# Patient Record
Sex: FEMALE
[De-identification: names, ages, dates, MRNs, and addresses within clinical notes are randomized per-mention and may not be internally consistent; named-entity substitution may affect disease eponyms.]

---

## 2017-01-25 ENCOUNTER — APPOINTMENT (OUTPATIENT)
Dept: NEUROLOGY | Facility: CLINIC | Age: 52
End: 2017-01-25

## 2017-05-23 ENCOUNTER — APPOINTMENT (OUTPATIENT)
Dept: ENDOCRINOLOGY | Facility: CLINIC | Age: 52
End: 2017-05-23

## 2019-11-04 ENCOUNTER — CHART COPY (OUTPATIENT)
Age: 54
End: 2019-11-04

## 2019-11-04 ENCOUNTER — APPOINTMENT (OUTPATIENT)
Dept: PHYSICAL MEDICINE AND REHAB | Facility: CLINIC | Age: 54
End: 2019-11-04
Payer: COMMERCIAL

## 2019-11-04 VITALS
OXYGEN SATURATION: 98 % | HEART RATE: 92 BPM | HEIGHT: 66 IN | BODY MASS INDEX: 18.8 KG/M2 | DIASTOLIC BLOOD PRESSURE: 75 MMHG | WEIGHT: 117 LBS | SYSTOLIC BLOOD PRESSURE: 124 MMHG

## 2019-11-04 DIAGNOSIS — Z80.7 FAMILY HISTORY OF OTHER MALIGNANT NEOPLASMS OF LYMPHOID, HEMATOPOIETIC AND RELATED TISSUES: ICD-10-CM

## 2019-11-04 PROCEDURE — 99204 OFFICE O/P NEW MOD 45 MIN: CPT | Mod: GC

## 2019-11-04 RX ORDER — DALFAMPRIDINE 10 MG/1
10 TABLET, FILM COATED, EXTENDED RELEASE ORAL
Refills: 0 | Status: ACTIVE | COMMUNITY

## 2019-11-04 RX ORDER — INTERFERON BETA-1A 30 MCG
30 KIT INTRAMUSCULAR
Refills: 0 | Status: ACTIVE | COMMUNITY

## 2019-11-04 RX ORDER — SENNOSIDES 8.6 MG/1
TABLET ORAL
Refills: 0 | Status: ACTIVE | COMMUNITY

## 2019-11-04 RX ORDER — BACLOFEN 20 MG/1
20 TABLET ORAL
Refills: 0 | Status: ACTIVE | COMMUNITY

## 2019-11-04 RX ORDER — UBIDECARENONE/VIT E ACET 100MG-5
CAPSULE ORAL
Refills: 0 | Status: ACTIVE | COMMUNITY

## 2019-11-04 RX ORDER — TIZANIDINE HYDROCHLORIDE 2 MG/1
2 CAPSULE ORAL
Refills: 0 | Status: ACTIVE | COMMUNITY

## 2019-11-04 RX ORDER — MYCOPHENOLATE MOFETIL 500 MG/1
500 TABLET, FILM COATED ORAL
Refills: 0 | Status: ACTIVE | COMMUNITY

## 2019-11-19 NOTE — HISTORY OF PRESENT ILLNESS
[FreeTextEntry1] : Patient is a 53-year-old female history of multiple sclerosis (diagnosed 2002) who presents today for a brace evaluation. Patient reports receiving an AFO approximately 4-5 years ago but discontinued using it secondary to it being too bulky. Patient was referred here secondary to hyperextension at her right knee during ambulation and episodes of knee swelling. Patient denies froilan pain at the right knee but does report episodic swelling and stiffness. Patient ambulates in her home independently with 2 straight axis canes, Patient uses a wheelchair for longer distance community ambulation. No falls reported in the last year. Patient has had 2-3 near fall secondary to catching her right toe.

## 2019-11-19 NOTE — REVIEW OF SYSTEMS
[Patient Intake Form Reviewed] : Patient intake form was reviewed [Joint Stiffness] : joint stiffness [Muscle Weakness] : muscle weakness [Difficulty Walking] : difficulty walking [Negative] : Gastrointestinal [Fever] : no fever [Incontinence] : no incontinence

## 2019-11-19 NOTE — ASSESSMENT
[FreeTextEntry1] : Patient is a 53-year-old female history multiple sclerosis with gait impairments noted above. Patient's exam notable for a significant right knee genu recurvatum with laxity at that posterior knee capsule and heelcord contracture. Patient will require a custom orthotic and the use of the orthosis is permanent. Prescription provided for a right custom molded AFO with [place] ankle joints, adjustable posterior stop, prominent heel, instep strap, full footplate, and flexible toe. Patient will likely require heel lifts to help further reduced genu recurvatum. Prescription provided for a course of outpatient physical therapy with emphasis on gait training with her new AFO and improving her gait mechanics, see RX. Prescription provided for a motorized scooter for long distance ambulation.

## 2019-11-19 NOTE — PHYSICAL EXAM
[FreeTextEntry1] : General: Well developed female in no apparent distress. Patient is awake, alert, and oriented x3. Cooperative.\par HEENT: Normal systolic, atraumatic\par Lungs: Clear to auscultation.\par Cardiac: Regular rate and rhythm.\par Abdomen: Bowel sounds present, nondistended.\par Skin: No rash or skin breakdown\par \par Motor:\par Both upper extremities: Tone normal, active range of motion within functional limits with 5/5 motor power throughout except right extensor digitorum 4/5 motor power, right digit abduction 4/5 MP. Thumb to digit opposition intact, no some difficulty noted with right digit 1- digit 5 opposition. Intrinsic muscle atrophy noted on the right.\par \par Left lower extremity: Hip flexors 4/5 motor power, knee extension 5/5 motor power, ankle 5/5 motor power.\par \par Right lower extremity: Hip flexion 1-2/5 motor power, knee extension 4+/5 motor power, ankle 0/5 motor power. Heelcord contracture noted with ankle dorsiflexion passively to approximately 5-10° shy of neutral with the knee flexed and extended.\par \par Sensory: Mild decrease in pinprick to the right lower extremity, proprioception intact at the right first MTP joint.\par Muscle stretch reflexes +1/2 both upper extremities and symmetric\par \par Functional status: Patient ambulated independently with 2 SAC and noted for toe strike on the right with circumduction. Significant genu recurvatum with mild right uncompensated gluteus medius gait. Genu recurvatum measured to 14° hyperextension during standing.

## 2020-01-13 ENCOUNTER — APPOINTMENT (OUTPATIENT)
Dept: PHYSICAL MEDICINE AND REHAB | Facility: CLINIC | Age: 55
End: 2020-01-13
Payer: COMMERCIAL

## 2020-01-13 VITALS — HEART RATE: 78 BPM | DIASTOLIC BLOOD PRESSURE: 57 MMHG | SYSTOLIC BLOOD PRESSURE: 98 MMHG | OXYGEN SATURATION: 98 %

## 2020-01-13 DIAGNOSIS — M67.01 SHORT ACHILLES TENDON (ACQUIRED), RIGHT ANKLE: ICD-10-CM

## 2020-01-13 PROCEDURE — 99213 OFFICE O/P EST LOW 20 MIN: CPT

## 2020-01-13 NOTE — HISTORY OF PRESENT ILLNESS
[FreeTextEntry1] : Patient is a 54-year-old female with history of multiple sclerosis who was last seen November 4, 2019. Patient received her new AFO December 16, 2019. Patient states that the brace fits well, no pain, no skin breakdown with AFO. Patient states the braces definitely improved her gait mechanics and her foot drop. No falls reported since receiving her AFO. Patient was receiving a recent right knee cortical steroid injection with good pain relief.

## 2020-01-13 NOTE — PHYSICAL EXAM
[FreeTextEntry1] : General: Well developed female in no apparent distress. Patient is awake, alert, and oriented x3. Cooperative.\par Skin: No rash or skin breakdown\par \par Motor:\par \par Left lower extremity: Hip flexors 4/5 motor power, knee extension 5/5 motor power, ankle 5/5 motor power.\par \par Right lower extremity: Hip flexion 1-2/5 motor power, knee extension 4+/5 motor power, ankle 0/5 motor power. Heelcord contracture noted with ankle dorsiflexion passively to approximately 5-10° shy of neutral with the knee flexed and extended.\par \par Sensory: Mild decrease in pinprick to the right lower extremity, proprioception intact at the right first MTP joint.\par Muscle stretch reflexes +1/2 both upper extremities and symmetric\par \par Functional status: Patient ambulated independently with 2 SAC and right Hinged AFO. Patient ambulates with shallow HS and continued significant genu recurvatum. Even with addition of ~1" heel lift on right patient had continued GR.

## 2020-01-13 NOTE — ASSESSMENT
[FreeTextEntry1] : Patient is a 54-year-old female with a history of multiple sclerosis and gait impairments noted above. The patient's new AFO has provided her with a shallow heelstrike, however she continues to have significant genu recurvatum even after the addition of a 1 inch heel lift. Discussed option of a custom molded KAFO which patient prefers to hold off on. Prescription provided for Swedish Knee Cage to be used with her AFO in physical therapy. Prescription provided for bilateral heel lifts- 1/2 inch. The patient provided  Rx for night splint to provide heelcord stretching on the right. Will touch base with  concerning KAFO.

## 2020-03-09 ENCOUNTER — APPOINTMENT (OUTPATIENT)
Dept: PHYSICAL MEDICINE AND REHAB | Facility: CLINIC | Age: 55
End: 2020-03-09

## 2020-11-30 ENCOUNTER — APPOINTMENT (OUTPATIENT)
Dept: PHYSICAL MEDICINE AND REHAB | Facility: CLINIC | Age: 55
End: 2020-11-30
Payer: COMMERCIAL

## 2020-11-30 VITALS
SYSTOLIC BLOOD PRESSURE: 95 MMHG | DIASTOLIC BLOOD PRESSURE: 63 MMHG | OXYGEN SATURATION: 97 % | TEMPERATURE: 98.6 F | HEART RATE: 82 BPM

## 2020-11-30 DIAGNOSIS — M21.861 OTHER SPECIFIED ACQUIRED DEFORMITIES OF RIGHT LOWER LEG: ICD-10-CM

## 2020-11-30 PROCEDURE — 99214 OFFICE O/P EST MOD 30 MIN: CPT

## 2020-12-10 DIAGNOSIS — M25.561 PAIN IN RIGHT KNEE: ICD-10-CM

## 2020-12-10 DIAGNOSIS — M25.562 PAIN IN RIGHT KNEE: ICD-10-CM

## 2020-12-11 ENCOUNTER — APPOINTMENT (OUTPATIENT)
Dept: ORTHOPEDIC SURGERY | Facility: CLINIC | Age: 55
End: 2020-12-11
Payer: COMMERCIAL

## 2020-12-11 DIAGNOSIS — M25.361 OTHER INSTABILITY, RIGHT KNEE: ICD-10-CM

## 2020-12-11 DIAGNOSIS — M22.8X1 OTHER DISORDERS OF PATELLA, RIGHT KNEE: ICD-10-CM

## 2020-12-11 DIAGNOSIS — M22.41 CHONDROMALACIA PATELLAE, RIGHT KNEE: ICD-10-CM

## 2020-12-11 DIAGNOSIS — R26.9 UNSPECIFIED ABNORMALITIES OF GAIT AND MOBILITY: ICD-10-CM

## 2020-12-11 DIAGNOSIS — G35 MULTIPLE SCLEROSIS: ICD-10-CM

## 2020-12-11 PROCEDURE — 99072 ADDL SUPL MATRL&STAF TM PHE: CPT

## 2020-12-11 PROCEDURE — 73564 X-RAY EXAM KNEE 4 OR MORE: CPT | Mod: LT

## 2020-12-11 PROCEDURE — 99203 OFFICE O/P NEW LOW 30 MIN: CPT | Mod: 25

## 2020-12-11 PROCEDURE — 20610 DRAIN/INJ JOINT/BURSA W/O US: CPT | Mod: RT

## 2020-12-11 NOTE — PHYSICAL EXAM
[de-identified] : General appearance: well nourished and hydrated, pleasant, alert and oriented x 3, cooperative.\par HEENT: Normocephalic, EOM intact, Nasal septum midline, Oral cavity clear, External auditory canal clear.\par Cardiovascular: no apparent abnormalities, no lower leg edema, no varicosities, pedal pulses are palpable.\par Lymphatics Lymph nodes: none palpated, Lymphedema: not present.\par Neurologic: sensation is normal, muscle weakness in upper or lower extremities, patella tendon reflexes intact, drop foot on the right\par Dermatologic no apparent skin lesions, moist, warm, no rash.\par Spine:cervical spine appears normal and moves freely, thoracic spine appears normal and moves freely, lumbosacral spine appears normal and moves freely.\par Gait: nonantalgic, and unsteady\par \par difficulty with transfers, hyperextension of right knee\par \par Left knee\par Inspection: no effusion or erythema.\par Wounds: none.\par Alignment: normal.\par Palpation: no specific tenderness on palpation.\par ROM active (in degrees): 0-115\par Ligamentous laxity: all ligaments appear stable,, negative ant. drawer test, negative post. drawer test, stable to varus stress test, stable to valgus stress test. negative Lachman's test, negative pivot shift test\par Meniscal Test: negative McMurrays, negative Yoselyn.\par Patellofemoral Alignment Test: Q angle-, normal.\par Muscle Test: good quad strength.\par \par Right knee\par Drop foot\par Inspection: moderate effusion\par Wounds: none.\par Alignment: normal.\par Palpation: no specific tenderness on palpation.\par ROM active (in degrees): 20 degrees of hyperextension, 0-45 (active), 0-90 (passive)\par Ligamentous laxity: all ligaments appear stable,, negative ant. drawer test, negative post. drawer test, stable to varus stress test, stable to valgus stress test. negative Lachman's test, negative pivot shift test\par Meniscal Test: negative McMurrays, negative Yoselyn.\par Patellofemoral Alignment Test: Q angle-, normal.\par Muscle Test: weak quad strength.\par \par Left hip\par Inspection: No swelling or ecchymosis.\par Wounds: none.\par Palpation: non-tender.\par Stability: no instability.\par Strength: 5/5 all motor groups.\par ROM: no pain with FROM.\par Leg length: equal.\par \par Right hip\par Inspection: No swelling or ecchymosis.\par Wounds: none.\par Palpation: non-tender.\par Stability: no instability.\par Strength: 5/5 all motor groups.\par ROM: limited active ROM\par Leg length: equal.\par \par Left ankle\par Inspection: no erythema noted, no swelling noted.\par Palpation: no pain on palpation .\par ROM: FROM without crepitus.\par Muscle strength: 5/5.\par Stability: no instability noted.\par \par Right ankle\par Inspection: no erythema noted, no swelling noted.\par ROM: FROM without crepitus.\par Palpation: no pain on palpation .\par Muscle strength: 5/5.\par Stability: no instability noted, weakness noted \par \par Left foot\par Inspection: color, texture and turgor are normal.\par ROM: full range of motion of all joints without pain or crepitus.\par Palpation: no tenderness.\par Stability: no instability noted.\par \par Right foot\par Inspection: color, texture and turgor are normal, equinus contracture\par ROM: full range of motion of all joints without pain or crepitus.\par Palpation: no tenderness.\par Stability: no instability noted, weakness noted \par \par Left shoulder\par Inspection: no muscle asymmetry, no atrophy.\par Palpation: no tenderness noted, ACJ non-tender.\par ROM: full active ROM, full passive ROM.\par Strength testing): anterior deltoid, supraspinatus, infraspinatus, subscapularis all 5/5.\par Stability test: ant. apprehension negative, post. apprehension negative, relocation test negative.\par Impingement Test: negative NEER.\par \par Right shoulder\par Inspection: no muscle asymmetry, no atrophy.\par Palpation: no tenderness noted, ACJ non-tender.\par ROM: full active ROM, full passive ROM.\par Strength testing): anterior deltoid, supraspinatus, infraspinatus, subscapularis all 5/5.\par Stability test: ant. apprehension negative, post. apprehension negative, relocation test negative.\par Impingement Test: negative NEER.\par Surgical Wounds: none.\par \par Left elbow\par Inspection: negative swelling.\par Wounds: none.\par Palpation: non-tender.\par ROM: full ROM.\par Strength: 5/5 all groups.\par Stability: no instability.\par Mass: none.\par \par Right elbow\par Inspection: negative swelling.\par Wounds: none.\par Palpation: non-tender.\par ROM: full ROM.\par Strength: 5/5 all groups.\par Stability: no instability.\par Mass: none.\par \par Left wrist\par Inspection: negative swelling.\par Wound: none.\par Palpation (bone): no tenderness.\par ROM: full ROM.\par Strength: full , good.\par \par Right wrist\par Inspection: negative swelling.\par Wound: none.\par Palpation (bone): no tenderness.\par ROM: full ROM.\par Strength: full , good.\par \par Left hand\par Inspection: no skin changes, normal appearance.\par Wounds: none.\par Strength: full , able to make full fist.\par Sensation: light touch intact all fingers and thumb.\par Vascular: good capillary refill < 3 seconds, all fingers and thumb.\par Mass: none.\par \par Right hand\par Inspection: no skin changes, normal appearance.\par Wounds: none.\par Strength: full , able to make full fist.\par Sensation: light touch intact all fingers and thumb.\par Vascular: good capillary refill < 3 seconds, all fingers and thumb.\par Mass: none.  [de-identified] : Right knee xrays, standing AP/Lateral and Merchant films, and 45 degree PA standing view, taken at the office today shows  normal alignment, good joint space maintained, well centered patella, hypoplastic sulcus \par \par Left knee xrays, standing AP/Lateral and Merchant films, and 45 degree PA standing view, taken at the office today shows  normal alignment, good joint space maintained, well centered patella, hypoplastic sulcus \par \par MRI Right knee taken 02/15/2019 films brought in and reviewed, see attached report.

## 2020-12-11 NOTE — HISTORY OF PRESENT ILLNESS
[de-identified] : 55 year old female presents for initial evaluation of right knee pain, for many years. PMHx of MS with right sided weakness. Patient denies any specific injury. She reports that her pain is medially localized, with discomfort on the lateral aspect of her knee. Her primary complain today in stability. She describes the pain to be intermittent and sharp, localized to the lateral aspect, with associated swelling and clicking. She reports that she is able to walk an unlimited distance with the help of a tripod walker. Patient takes the stairs one at a time using the handrail. Patient is able to negotiate the stairs alternating normally. Patient denies the use of any pain medications. She states that she has had multiple aspirations and cortisone injection over the last 2 years, with the most recent in Dec 2019. She reports having an MRI in Feb 2019. Patient states that she has since been using a [place] knee cage brace to prevent hyperextension, but it is not helpful. Patient also has a drop foot on the right side, and uses an AFO. Since her [place] knee cage brace is not working she was prescribed a KAFO, and referred to an orthotist for fitting. The orthotist referred her to Dr. Levine for possible aspiration, as well as a knee evaluation.

## 2020-12-11 NOTE — ADDENDUM
[FreeTextEntry1] : This note was written by Roel Stallings on 12/11/2020 acting as scribe for Dr. Serafin Levine M.D.\par \par I, Dr. Serafin Levine, have read and attest that all the information, medical decision making and discharge instructions within are true and accurate.

## 2020-12-11 NOTE — PROCEDURE
[de-identified] : RIGHT KNEE ASPIRATION\par Discussed at length the procedure of a knee aspiration. The risks, benefits, convalescence and alternatives were reviewed. The possible side effects discussed included but were not limited to: pain, swelling, bleeding and infection. Following this discussion, the knee was prepped with betadine and under a sterile condition, an 18 gauge needle was inserted into the joint through a lateral approach just superior to the patella with the knee in an extended position. The fluid was aspirated and sent for evaluation. Upon withdrawal of the needle the site was cleaned with alcohol and a bandaid applied. The patient tolerated the aspiration well and there were no adverse effects. Post aspiration instructions included no strenuous activity for 24 hours, cryotherapy and if there are any adverse effects to contact the office.\par \par 35 cc of clear yellow synovial fluid were aspirated from the right knee. \par \par RIGHT KNEE CORTISONE INJECTION\par Discussed at length with the patient the planned steroid and lidocaine injection. The risks, benefits, convalescence and alternatives were reviewed. The possible side effects discussed included but were not limited to: pain, swelling, heat and redness. These symptoms are generally mild but if they are extensive then contact the office. Giving pain relievers by mouth such as NSAID’s or Tylenol can generally treat the reactions to steroid and lidocaine. Rare cases of infection have been noted. Rash, hives and itching may occur post injection. If you have muscle pain or cramps, flushing and or swelling of the face, rapid heart beat, nausea, dizziness, fever, chills, headache, difficulty breathing, swelling in the arms or legs, or have a prickly feeling of your skin, contact a health care provider immediately.\par \par Following this discussion, the knee was prepped with betadine and under sterile conditions 9 cc of 1% lidocaine and 1 cc depo-medrol were injected with a 21 gauge needle. The needle was introduced into the joint, aspiration was performed to ensure intra-articular placement and the medication was injected. Upon withdrawal of the needle the site was cleaned with alcohol and a bandaid applied. The patient tolerated the injection well and there were no adverse effects. Post injection instructions included no strenuous activity for 24 hours, cryotherapy and if there are any adverse effects to contact the office

## 2020-12-15 LAB
B PERT IGG+IGM PNL SER: ABNORMAL
COLOR FLD: NORMAL
FLUID INTAKE SUBSTANCE CLASS: NORMAL
LYMPHOCYTES # FLD MANUAL: 59 %
MESOTHL CELL NFR FLD: 3 %
MONOS+MACROS NFR FLD MANUAL: 34 %
NEUTS SEG # FLD MANUAL: 4 %
RBC # FLD MANUAL: 8000 /UL
SYCRY CLARITY: ABNORMAL
SYCRY COLOR: ABNORMAL
SYCRY ID: NORMAL
SYCRY TUBE: NORMAL
TOTAL CELLS COUNTED FLD: 270 /UL
TUBE TYPE: NORMAL

## 2021-01-04 LAB — BACTERIA FLD CULT: NORMAL

## 2021-01-05 NOTE — ASSESSMENT
[FreeTextEntry1] : Patient is a 55-year-old female history of multiple sclerosis, severe right genu recurvatum with the impairments noted above. The patient's Telugu Knee cage is providing minimal improvement in her genu recurvatum and the clinical exam is notable for right knee degenerative joint disease. The patient's posterior ligaments are excessively stretched at the knee and adding further heel lifts will not be adequate. Due to patient’s progression of MS and worsening of her hyperextension, she has medical necessity for a custom KAFO.  The current custom hinged AFO along with the Swedish knee cage is unable to control her genu recurvatum and separate componentry is increasing the patient’s fall risk. Patient requires stabilization of the foot and ankle and knee (because patient’s foot drops and scuffs the ground during ambulation while the knee significantly hyperextends and the custom KAFO will continue to hold the foot in a neutral position during ambulation and permit the patient’s knee to hyperextend only a few degrees in an effort to limit knee instability. The patient has the ability to benefit functionally in that she will be able to ambulate with increased safety and a decreased risk for falls.  Patient will require the KAFO be custom fabricated because it will be for long-term use (longer than six months).  Additionally, patient will require the KAFO be custom fabricated as there are no off-the-shelf KAFOs that can control genu recurvatum.\par  Prescription provided for a right custom molded KAFO with offset knee joints, hinged ankle joint, adjustable posterior stop, instead strap, full footplate and flexible toe to control her knee hyperextension, see Rx. Prescription provided to initiate outpatient physical therapy with emphasis on gait training with new KAFO, recommended patient's first session with KAFO occur in physical therapy. After patient obtains KAFO, if she still having knee pain, will consider series of 3 Hyalgan injections. Patient refer to [facility] for a power wheelchair/scooter. Patient reports having x-ray of the right knee, results to be faxed to the office.

## 2021-01-05 NOTE — HISTORY OF PRESENT ILLNESS
[FreeTextEntry1] : Patient is a 55-year-old female history of multiple sclerosis, severe right genu recurvatum who was last seen January 13, 2020. Patient reports receiving her [place] knee cage in March 20 2020. Followup difficulty 2/2 COVID-19.. Patient feels that the brace is not adequately preventing her hyperextension at the knee and she continues ambulate with her AFO. No falls reported. Patient started using a tripod walker and has been complaining of right knee pain with weight-bearing activities. Swelling noted at the right knee, no redness. Her right knee pain and right lower extremity weaknesses limiting her ambulation and she is interested in a motorized wheelchair/scooter.

## 2021-01-05 NOTE — REVIEW OF SYSTEMS
[Joint Pain] : joint pain [Muscle Weakness] : muscle weakness [Difficulty Walking] : difficulty walking [Negative] : Gastrointestinal [Fever] : no fever [Incontinence] : no incontinence

## 2021-01-05 NOTE — PHYSICAL EXAM
[FreeTextEntry1] : General: Well developed female in no apparent distress. Patient is awake, alert, and oriented x3. Cooperative.\par Skin: No rash or skin breakdown\par \par Motor:\par \par Left lower extremity: Hip flexors 4+/5 motor power, knee extension 5/5 motor power, ankle 5/5 motor power.\par \par Right lower extremity: Hip flexion 1-2/5 motor power, knee extension 4+/5 motor power, ankle 0/5 motor power. Heelcord contracture noted with ankle dorsiflexion passively to approximately 5-10° shy of neutral with the knee flexed and extended.\par \par Sensory: Mild decrease in pinprick to the right lower extremity.\par Muscle stretch reflexes +1/2 both upper extremities and symmetric\par \par Functional status: Patient ambulated independently with 2 tripod walker and right Hinged AFO. Patient ambulates with shallow HS and continued significant genu recurvatum. Even with addition of 1/2" heel lift on right patient had continued GR. Italian Knee cage provides minimal reduction in genu recurvatum.

## 2021-06-14 ENCOUNTER — TRANSCRIPTION ENCOUNTER (OUTPATIENT)
Age: 56
End: 2021-06-14

## 2025-09-08 ENCOUNTER — NON-APPOINTMENT (OUTPATIENT)
Age: 60
End: 2025-09-08

## 2025-09-10 ENCOUNTER — APPOINTMENT (OUTPATIENT)
Dept: PHYSICAL MEDICINE AND REHAB | Facility: CLINIC | Age: 60
End: 2025-09-10
Payer: COMMERCIAL

## 2025-09-10 DIAGNOSIS — M21.861 OTHER SPECIFIED ACQUIRED DEFORMITIES OF RIGHT LOWER LEG: ICD-10-CM

## 2025-09-10 DIAGNOSIS — G35 MULTIPLE SCLEROSIS: ICD-10-CM

## 2025-09-10 DIAGNOSIS — R26.9 UNSPECIFIED ABNORMALITIES OF GAIT AND MOBILITY: ICD-10-CM

## 2025-09-10 PROCEDURE — 99203 OFFICE O/P NEW LOW 30 MIN: CPT
